# Patient Record
Sex: FEMALE | Race: BLACK OR AFRICAN AMERICAN | Employment: FULL TIME
[De-identification: names, ages, dates, MRNs, and addresses within clinical notes are randomized per-mention and may not be internally consistent; named-entity substitution may affect disease eponyms.]

---

## 2017-06-17 ENCOUNTER — HEALTH MAINTENANCE LETTER (OUTPATIENT)
Age: 57
End: 2017-06-17

## 2018-08-21 ENCOUNTER — THERAPY VISIT (OUTPATIENT)
Dept: PHYSICAL THERAPY | Facility: CLINIC | Age: 58
End: 2018-08-21
Payer: COMMERCIAL

## 2018-08-21 DIAGNOSIS — S93.401A SPRAIN OF RIGHT ANKLE: Primary | ICD-10-CM

## 2018-08-21 PROCEDURE — 97161 PT EVAL LOW COMPLEX 20 MIN: CPT | Mod: GP | Performed by: PHYSICAL THERAPIST

## 2018-08-21 PROCEDURE — 97110 THERAPEUTIC EXERCISES: CPT | Mod: GP | Performed by: PHYSICAL THERAPIST

## 2018-08-21 NOTE — MR AVS SNAPSHOT
After Visit Summary   8/21/2018    Khadra Cloud    MRN: [id number]           Patient Information     Date Of Birth          1960        Visit Information        Provider Department      8/21/2018 11:30 AM Colton Oh, PT [facility]        Today's Diagnoses     Sprain of right ankle    -  1       Follow-ups after your visit        Who to contact     If you have questions or need follow up information about today's clinic visit or your schedule please contact [facility] directly at [phone].  Normal or non-critical lab and imaging results will be communicated to you by MyChart, letter or phone within 4 business days after the clinic has received the results. If you do not hear from us within 7 days, please contact the clinic through MyChart or phone. If you have a critical or abnormal lab result, we will notify you by phone as soon as possible.  Submit refill requests through [employer] or call your pharmacy and they will forward the refill request to us. Please allow 3 business days for your refill to be completed.          Additional Information About Your Visit        Care EveryWhere ID     This is your Care EveryWhere ID. This could be used by other organizations to access your [facility] medical records  [id number]         Blood Pressure from Last 3 Encounters:   No data found for BP    Weight from Last 3 Encounters:   No data found for Wt              We Performed the Following     RYAN Inital Eval Report     PT Eval, Low Complexity (52204)     Therapeutic Exercises        Primary Care Provider Office Phone # Fax #    Chris Plasencia [phone]       [address]  [facility] [address]        Equal Access to Services     [facility] : Hadii aad ku hadasho Soomaali, waaxda luqadaha, qaybta kaalmaminerva pruitt . [facility] [phone].    ATENCIÓN: Si  livia haskins, tiene a nixon disposición servicios gratuitos de asistencia lingüística. Marcela villa [phone].    We comply with applicable federal civil rights laws and [place] laws. We do not discriminate on the basis of race, color, national origin, age, disability, sex, sexual orientation, or gender identity.            Thank you!     Thank you for choosing [facility]  for your care. Our goal is always to provide you with excellent care. Hearing back from our patients is one way we can continue to improve our services. Please take a few minutes to complete the written survey that you may receive in the mail after your visit with us. Thank you!             Your Updated Medication List - Protect others around you: Learn how to safely use, store and throw away your medicines at www.disposemymeds.org.      Notice  As of 8/21/2018 11:59 PM    You have not been prescribed any medications.

## 2018-08-21 NOTE — PROGRESS NOTES
[facility] Initial Evaluation  Subjective:  Patient is a 58 year old female presenting with rehab right ankle/foot hpi.   Khadra Cloud is a 58 year old female with a right ankle condition.  Condition occurred with:  A fall/slip.  Condition occurred: at home.  This is a new condition  7/1/2018   AVULSION.    Patient reports pain:  Lateral, medial and posterior.    Pain is described as aching and sharp and is intermittent and reported as 5/10.  Associated symptoms:  Loss of motion/stiffness, loss of strength and edema. Pain is the same all the time.  Symptoms are exacerbated by standing Relieved by: STEROID.  Since onset symptoms are gradually improving.  Special tests:  X-ray and MRI.  Previous treatment: NONE.    General health as reported by patient is good.  Pertinent medical history includes:  None.  Medical allergies: no.  Other surgeries include:  Other.  Current medications:  Anti-inflammatory and steroids.  Current occupation is [employer].  Patient is working in normal job without restrictions.  Primary job tasks include:  Prolonged standing and lifting.    Barriers include:  None as reported by patient.    Red flags: NONE.                        Objective:  System    Ankle/Foot Evaluation  ROM:      PROM:    Dorsiflexion:  Left:    8     Right:   0   Plantarflexion:  Left:    WNL     Right:  18  Inversion: Left:          Right:   SEVERE LIMITATION  Eversion: Left:      Right:  SEVERE LIMITATION          Strength:    Dorsiflexion:  Right: 5/5   Pain:  Plantarflexion: Right: /5 weak/painful Pain:    Eversion:Right: 5/5  Pain:                  LIGAMENT TESTING:   Anterior Drawer (ATF) Right: neg  Posterior Drawer (PTF) Right: neg  Varus Stress (Calc Fib) Right: pos  Valgus Stress (Deltoid) Right: pos    External Rotation (High Ankle) Right: neg    PALPATION:     Right ankle tenderness present at:   achilles tendon; deltoid ligament; anterior talofibular ligament; calcaneofibular  ligament; anterior tibiofibular ligament and posterior tibiofibular ligament  EDEMA:     Right ankle edema present at:  medial; lateral; posterior and 3/5      Figure 8 right: 3/5                                                      General     ROS    Assessment/Plan:    Patient is a 58 year old female with right side ankle complaints.    Patient has the following significant findings with corresponding treatment plan.                Diagnosis 1:  SPRAIN OF RIGHT ANKLE  Pain -  hot/cold therapy, US, electric stimulation, manual therapy, splint/taping/bracing/orthotics, self management, education, directional preference exercise and home program  Decreased ROM/flexibility - manual therapy, therapeutic exercise, therapeutic activity and home program  Decreased strength - therapeutic exercise, therapeutic activities and home program  Edema - cold therapy and self management/home program  Impaired gait - gait training, assistive devices and home program  Decreased function - therapeutic activities and home program    Therapy Evaluation Codes:   1) History comprised of:   Personal factors that impact the plan of care:      Past/current experiences.    Comorbidity factors that impact the plan of care are:      None.     Medications impacting care: Anti-inflammatory and Steroids.  2) Examination of Body Systems comprised of:   Body structures and functions that impact the plan of care:      Ankle.   Activity limitations that impact the plan of care are:      Driving, Lifting, Squatting/kneeling, Stairs, Standing, Throwing and Working.  3) Clinical presentation characteristics are:   Stable/Uncomplicated.  4) Decision-Making    Low complexity using standardized patient assessment instrument and/or measureable assessment of functional outcome.  Cumulative Therapy Evaluation is: Low complexity.    Previous and current functional limitations:  (See Goal Flow Sheet for this information)    Short term and Long term goals: (See  Goal Flow Sheet for this information)     Communication ability:  Patient appears to be able to clearly communicate and understand verbal and written communication and follow directions correctly.  Treatment Explanation - The following has been discussed with the patient:   RX ordered/plan of care  Anticipated outcomes  Possible risks and side effects  This patient would benefit from PT intervention to resume normal activities.   Rehab potential is fair.    Frequency:  1 X week, once daily  Duration:  for 8 weeks  Discharge Plan:  Achieve all LTG.  Independent in home treatment program.  Reach maximal therapeutic benefit.    Please refer to the daily flowsheet for treatment today, total treatment time and time spent performing 1:1 timed codes.

## 2018-08-21 NOTE — LETTER
[facility]  [address]  [facility] [address]  [phone]    2018    Re: Khadra Cloud   :   1960  MRN:  [id number]   REFERRING PHYSICIAN:   Benjamin Felty    [facility]    Date of Initial Evaluation:    Visits:  Rxs Used: 1  Reason for Referral:  Sprain of right ankle  EVALUATION SUMMARY    [facility] Initial Evaluation  Subjective:  Patient is a 58 year old female presenting with rehab right ankle/foot hpi.   Khadra Cloud is a 58 year old female with a right ankle condition.  Condition occurred with:  A fall/slip.  Condition occurred: at home.  This is a new condition  2018   AVULSION.    Patient reports pain:  Lateral, medial and posterior.    Pain is described as aching and sharp and is intermittent and reported as 5/10.  Associated symptoms:  Loss of motion/stiffness, loss of strength and edema. Pain is the same all the time.  Symptoms are exacerbated by standing Relieved by: STEROID.  Since onset symptoms are gradually improving.  Special tests:  X-ray and MRI.  Previous treatment: NONE.    General health as reported by patient is good.  Pertinent medical history includes:  None.  Medical allergies: no.  Other surgeries include:  Other.  Current medications:  Anti-inflammatory and steroids.  Current occupation is .  Patient is working in normal job without restrictions.  Primary job tasks include:  Prolonged standing and lifting.  Barriers include:  None as reported by patient.  Red flags: NONE.    Objective:  System    Ankle/Foot Evaluation  ROM:    PROM:    Dorsiflexion:  Left:    8     Right:   0   Plantarflexion:  Left:    WNL     Right:  18  Inversion: Left:          Right:   SEVERE LIMITATION  Eversion: Left:      Right:  SEVERE LIMITATION  Strength:    Dorsiflexion:  Right: 5/5   Pain:  Plantarflexion: Right: /5 weak/painful  Pain:  Eversion:Right: 5/5  Pain:      LIGAMENT TESTING:   Anterior Drawer (ATF) Right: neg  Posterior Drawer (PTF) Right: neg  Varus Stress (Calc Fib) Right: pos  Valgus Stress (Deltoid) Right: pos  External Rotation (High Ankle) Right: neg  PALPATION:     Right ankle tenderness present at:   achilles tendon; deltoid ligament; anterior talofibular ligament; calcaneofibular ligament; anterior tibiofibular ligament and posterior tibiofibular ligament  EDEMA:   Right ankle edema present at:  medial; lateral; posterior and 3/5  Figure 8 right: 3/5    Assessment/Plan:    Patient is a 58 year old female with right side ankle complaints.    Patient has the following significant findings with corresponding treatment plan.                Diagnosis 1:  SPRAIN OF RIGHT ANKLE  Pain -  hot/cold therapy, US, electric stimulation, manual therapy, splint/taping/bracing/orthotics, self management, education, directional preference exercise and home program  Decreased ROM/flexibility - manual therapy, therapeutic exercise, therapeutic activity and home program  Decreased strength - therapeutic exercise, therapeutic activities and home program  Edema - cold therapy and self management/home program  Impaired gait - gait training, assistive devices and home program  Decreased function - therapeutic activities and home program    Therapy Evaluation Codes:   1) History comprised of:   Personal factors that impact the plan of care:      Past/current experiences.    Comorbidity factors that impact the plan of care are:      None.     Medications impacting care: Anti-inflammatory and Steroids.  2) Examination of Body Systems comprised of:   Body structures and functions that impact the plan of care:      Ankle.   Activity limitations that impact the plan of care are:      Driving, Lifting, Squatting/kneeling, Stairs, Standing, Throwing and Working.  3) Clinical presentation characteristics  are:   Stable/Uncomplicated.  4) Decision-Making    Low complexity using standardized patient assessment instrument and/or measureable assessment of functional outcome.  Cumulative Therapy Evaluation is: Low complexity.    Previous and current functional limitations:  (See Goal Flow Sheet for this information)    Short term and Long term goals: (See Goal Flow Sheet for this information)     Communication ability:  Patient appears to be able to clearly communicate and understand verbal and written communication and follow directions correctly.  Treatment Explanation - The following has been discussed with the patient:   RX ordered/plan of care  Anticipated outcomes  Possible risks and side effects  This patient would benefit from PT intervention to resume normal activities.   Rehab potential is fair.    Frequency:  1 X week, once daily  Duration:  for 8 weeks  Discharge Plan:  Achieve all LTG.  Independent in home treatment program.  Reach maximal therapeutic benefit.    Please refer to the daily flowsheet for treatment today, total treatment time and time spent performing 1:1 timed codes.     Thank you for your referral.    INQUIRIES  Therapist: Sacha Ibanez,PT  [facility]  [address]  [place] [address]  Phone: [phone]  Fax: [phone]

## 2018-08-23 PROBLEM — S93.401A SPRAIN OF RIGHT ANKLE: Status: ACTIVE | Noted: 2018-08-23

## 2018-10-23 ENCOUNTER — THERAPY VISIT (OUTPATIENT)
Dept: PHYSICAL THERAPY | Facility: CLINIC | Age: 58
End: 2018-10-23
Payer: COMMERCIAL

## 2018-10-23 DIAGNOSIS — S93.401A SPRAIN OF RIGHT ANKLE: ICD-10-CM

## 2018-10-23 PROCEDURE — 97110 THERAPEUTIC EXERCISES: CPT | Mod: GP | Performed by: PHYSICAL THERAPIST

## 2018-10-23 PROCEDURE — 97140 MANUAL THERAPY 1/> REGIONS: CPT | Mod: GP | Performed by: PHYSICAL THERAPIST

## 2018-11-05 ENCOUNTER — THERAPY VISIT (OUTPATIENT)
Dept: PHYSICAL THERAPY | Facility: CLINIC | Age: 58
End: 2018-11-05
Payer: COMMERCIAL

## 2018-11-05 DIAGNOSIS — S93.401A SPRAIN OF RIGHT ANKLE: ICD-10-CM

## 2018-11-05 PROCEDURE — 97112 NEUROMUSCULAR REEDUCATION: CPT | Mod: GP | Performed by: PHYSICAL THERAPIST

## 2018-11-05 PROCEDURE — 97530 THERAPEUTIC ACTIVITIES: CPT | Mod: GP | Performed by: PHYSICAL THERAPIST

## 2018-11-05 PROCEDURE — 97110 THERAPEUTIC EXERCISES: CPT | Mod: GP | Performed by: PHYSICAL THERAPIST

## 2018-11-19 ENCOUNTER — THERAPY VISIT (OUTPATIENT)
Dept: PHYSICAL THERAPY | Facility: CLINIC | Age: 58
End: 2018-11-19
Payer: COMMERCIAL

## 2018-11-19 DIAGNOSIS — S93.401A SPRAIN OF RIGHT ANKLE: ICD-10-CM

## 2018-11-19 PROCEDURE — 97110 THERAPEUTIC EXERCISES: CPT | Mod: GP

## 2018-11-19 PROCEDURE — 97112 NEUROMUSCULAR REEDUCATION: CPT | Mod: GP

## 2018-11-19 PROCEDURE — 97530 THERAPEUTIC ACTIVITIES: CPT | Mod: GP

## 2018-12-19 ENCOUNTER — THERAPY VISIT (OUTPATIENT)
Dept: PHYSICAL THERAPY | Facility: CLINIC | Age: 58
End: 2018-12-19
Payer: COMMERCIAL

## 2018-12-19 DIAGNOSIS — S93.401A SPRAIN OF RIGHT ANKLE: ICD-10-CM

## 2018-12-19 PROCEDURE — 97140 MANUAL THERAPY 1/> REGIONS: CPT | Mod: GP

## 2018-12-19 PROCEDURE — 97110 THERAPEUTIC EXERCISES: CPT | Mod: GP

## 2018-12-19 PROCEDURE — 97112 NEUROMUSCULAR REEDUCATION: CPT | Mod: GP

## 2018-12-19 PROCEDURE — 97530 THERAPEUTIC ACTIVITIES: CPT | Mod: GP

## 2019-01-03 ENCOUNTER — THERAPY VISIT (OUTPATIENT)
Dept: PHYSICAL THERAPY | Facility: CLINIC | Age: 59
End: 2019-01-03
Payer: COMMERCIAL

## 2019-01-03 DIAGNOSIS — S93.401A SPRAIN OF RIGHT ANKLE: ICD-10-CM

## 2019-01-03 PROCEDURE — 97110 THERAPEUTIC EXERCISES: CPT | Mod: GP | Performed by: PHYSICAL THERAPIST

## 2019-01-03 PROCEDURE — 97112 NEUROMUSCULAR REEDUCATION: CPT | Mod: GP | Performed by: PHYSICAL THERAPIST

## 2019-01-03 PROCEDURE — 97140 MANUAL THERAPY 1/> REGIONS: CPT | Mod: GP | Performed by: PHYSICAL THERAPIST

## 2019-10-03 PROBLEM — S93.401A SPRAIN OF RIGHT ANKLE: Status: RESOLVED | Noted: 2018-08-23 | Resolved: 2019-10-03

## 2019-10-03 NOTE — PROGRESS NOTES
Patient did not complete the planned course of treatment so current status is unknown.  Please see 1/3/2019 flowsheet for discharge status.  Discharge from physical therapy at this time.